# Patient Record
(demographics unavailable — no encounter records)

---

## 2025-06-04 NOTE — PLAN
[FreeTextEntry1] : 1.  Annual physical exam: Will obtain an EKG blood work and advise once results are known.  Advised weight loss low-salt diet and exercise 2.  Hypertension: Will discontinue lisinopril and amlodipine and start Tribenzor 40 - 5 - 25 mg 1 tablet daily.  Advised to undergo a sleep study.  Suggest weight loss. RTC 4 weeks

## 2025-06-04 NOTE — HISTORY OF PRESENT ILLNESS
[de-identified] : 30-year-old male with past medical history of hypertension comes in to establish care.  Overall, he feels well denies chest pain shortness of breath PND orthopnea.

## 2025-06-04 NOTE — HEALTH RISK ASSESSMENT
[Change in mental status noted] : No change in mental status noted [None] : None [With Significant Other] : lives with significant other [Employed] : employed [High School] : high school [] :  [Sexually Active] : sexually active [High Risk Behavior] : no high risk behavior [Feels Safe at Home] : Feels safe at home [Fully functional (bathing, dressing, toileting, transferring, walking, feeding)] : Fully functional (bathing, dressing, toileting, transferring, walking, feeding) [Fully functional (using the telephone, shopping, preparing meals, housekeeping, doing laundry, using] : Fully functional and needs no help or supervision to perform IADLs (using the telephone, shopping, preparing meals, housekeeping, doing laundry, using transportation, managing medications and managing finances) [Reports changes in hearing] : Reports no changes in hearing [Reports changes in vision] : Reports no changes in vision [Reports normal functional visual acuity (ie: able to read med bottle)] : Reports normal functional visual acuity [Reports changes in dental health] : Reports no changes in dental health

## 2025-07-02 NOTE — DISCUSSION/SUMMARY
[FreeTextEntry1] : 1.  Hypertension: Much better on current regimen of medication.  Will obtain a basic metabolic panel to assess renal function.  Will refer for sleep study to rule out obstructive sleep apnea. 2.  Hyperlipidemia: Repeat fast lipid panel LDL cholesterol 134 ideally LDL less than 100 before patient to consider weight loss.  rtc 4m

## 2025-07-02 NOTE — HISTORY OF PRESENT ILLNESS
[FreeTextEntry1] : 31-year-old male with past medical history of hypertension hyperlipidemia comes in for follow-up.  Overall feels well has been tolerating the medication.  Denies any chest pain shortness of breath PND orthopnea.